# Patient Record
Sex: MALE | Race: ASIAN | NOT HISPANIC OR LATINO | Employment: FULL TIME | URBAN - METROPOLITAN AREA
[De-identification: names, ages, dates, MRNs, and addresses within clinical notes are randomized per-mention and may not be internally consistent; named-entity substitution may affect disease eponyms.]

---

## 2024-05-15 PROBLEM — R29.810 FACIAL DROOP: Status: ACTIVE | Noted: 2024-05-15

## 2024-05-15 PROBLEM — E11.9 TYPE 2 DIABETES MELLITUS, WITHOUT LONG-TERM CURRENT USE OF INSULIN: Status: ACTIVE | Noted: 2024-05-15

## 2024-05-15 PROBLEM — R91.8 LUNG MASS: Status: ACTIVE | Noted: 2024-05-15

## 2024-05-15 PROBLEM — R73.9 HYPERGLYCEMIA: Status: ACTIVE | Noted: 2024-05-15

## 2024-05-15 PROBLEM — E78.5 HYPERLIPIDEMIA: Status: ACTIVE | Noted: 2024-05-15

## 2024-05-17 ENCOUNTER — TELEPHONE (OUTPATIENT)
Dept: DIABETES | Facility: CLINIC | Age: 64
End: 2024-05-17

## 2024-05-21 PROBLEM — F17.200 SMOKING ADDICTION: Status: ACTIVE | Noted: 2024-05-21

## 2024-05-23 ENCOUNTER — TELEPHONE (OUTPATIENT)
Dept: PULMONOLOGY | Facility: CLINIC | Age: 64
End: 2024-05-23
Payer: COMMERCIAL

## 2024-05-23 NOTE — TELEPHONE ENCOUNTER
Dr Paredes's nurse at [facility]  received a message about Mr Samuel and his abnormal CT scan. Dr Paredes said he will see him tomorrow at 8am on [facility]. I called Mr Samuel and he said he has important business tomorrow morning and can he come in the afternoon? I told him I will ask and call him back. Monica Amador LPN

## 2024-05-24 ENCOUNTER — OFFICE VISIT (OUTPATIENT)
Dept: PULMONOLOGY | Facility: CLINIC | Age: 64
End: 2024-05-24
Payer: COMMERCIAL

## 2024-05-24 ENCOUNTER — HOSPITAL ENCOUNTER (OUTPATIENT)
Dept: PULMONOLOGY | Facility: CLINIC | Age: 64
Discharge: HOME | End: 2024-05-24
Payer: COMMERCIAL

## 2024-05-24 ENCOUNTER — LAB VISIT (OUTPATIENT)
Dept: LAB | Facility: HOSPITAL | Age: 64
End: 2024-05-24
Attending: INTERNAL MEDICINE
Payer: COMMERCIAL

## 2024-05-24 VITALS
WEIGHT: 177.25 LBS | HEIGHT: 67 IN | BODY MASS INDEX: 27.82 KG/M2 | OXYGEN SATURATION: 95 % | SYSTOLIC BLOOD PRESSURE: 132 MMHG | DIASTOLIC BLOOD PRESSURE: 72 MMHG | HEART RATE: 117 BPM

## 2024-05-24 DIAGNOSIS — E11.9 NEW ONSET TYPE 2 DIABETES MELLITUS: ICD-10-CM

## 2024-05-24 DIAGNOSIS — J98.4 CAVITARY LESION OF LUNG: Primary | ICD-10-CM

## 2024-05-24 DIAGNOSIS — Z20.1 HISTORY OF EXPOSURE TO TUBERCULOSIS: ICD-10-CM

## 2024-05-24 DIAGNOSIS — J98.4 CAVITARY LESION OF LUNG: ICD-10-CM

## 2024-05-24 DIAGNOSIS — F17.210 CIGARETTE NICOTINE DEPENDENCE WITHOUT COMPLICATION: ICD-10-CM

## 2024-05-24 DIAGNOSIS — R91.8 PULMONARY MASS: ICD-10-CM

## 2024-05-24 LAB
ANION GAP SERPL CALC-SCNC: 11 MMOL/L (ref 8–16)
BUN SERPL-MCNC: 6 MG/DL (ref 8–23)
CALCIUM SERPL-MCNC: 10.2 MG/DL (ref 8.7–10.5)
CHLORIDE SERPL-SCNC: 102 MMOL/L (ref 95–110)
CO2 SERPL-SCNC: 22 MMOL/L (ref 23–29)
CREAT SERPL-MCNC: 0.9 MG/DL (ref 0.5–1.4)
EST. GFR  (NO RACE VARIABLE): >60 ML/MIN/1.73 M^2
ESTIMATED AVG GLUCOSE: 318 MG/DL (ref 68–131)
GLUCOSE SERPL-MCNC: 128 MG/DL (ref 70–110)
HBA1C MFR BLD: 12.7 % (ref 4–5.6)
HIV 1+2 AB+HIV1 P24 AG SERPL QL IA: NORMAL
POTASSIUM SERPL-SCNC: 3.9 MMOL/L (ref 3.5–5.1)
SODIUM SERPL-SCNC: 135 MMOL/L (ref 136–145)

## 2024-05-24 PROCEDURE — 80048 BASIC METABOLIC PNL TOTAL CA: CPT | Performed by: INTERNAL MEDICINE

## 2024-05-24 PROCEDURE — 99205 OFFICE O/P NEW HI 60 MIN: CPT | Mod: S$GLB,,, | Performed by: INTERNAL MEDICINE

## 2024-05-24 PROCEDURE — 94640 AIRWAY INHALATION TREATMENT: CPT | Mod: S$GLB,,, | Performed by: INTERNAL MEDICINE

## 2024-05-24 PROCEDURE — 87389 HIV-1 AG W/HIV-1&-2 AB AG IA: CPT | Performed by: INTERNAL MEDICINE

## 2024-05-24 PROCEDURE — 3075F SYST BP GE 130 - 139MM HG: CPT | Mod: CPTII,S$GLB,, | Performed by: INTERNAL MEDICINE

## 2024-05-24 PROCEDURE — 36415 COLL VENOUS BLD VENIPUNCTURE: CPT | Performed by: INTERNAL MEDICINE

## 2024-05-24 PROCEDURE — 99999 PR PBB SHADOW E&M-EST. PATIENT-LVL III: CPT | Mod: PBBFAC,,, | Performed by: INTERNAL MEDICINE

## 2024-05-24 PROCEDURE — 1159F MED LIST DOCD IN RCRD: CPT | Mod: CPTII,S$GLB,, | Performed by: INTERNAL MEDICINE

## 2024-05-24 PROCEDURE — 3078F DIAST BP <80 MM HG: CPT | Mod: CPTII,S$GLB,, | Performed by: INTERNAL MEDICINE

## 2024-05-24 PROCEDURE — 83036 HEMOGLOBIN GLYCOSYLATED A1C: CPT | Performed by: INTERNAL MEDICINE

## 2024-05-24 PROCEDURE — 3008F BODY MASS INDEX DOCD: CPT | Mod: CPTII,S$GLB,, | Performed by: INTERNAL MEDICINE

## 2024-05-24 NOTE — PROGRESS NOTES
Gave pt 3 % hypertonic saline via nebulizer. Productive cough. NARN. Sent pt to lab to dropp off specimen

## 2024-05-24 NOTE — PROGRESS NOTES
History & Physical  [facility]    SUBJECTIVE:     Chief Complaint:   Cavitary lung lesions    History of Present Illness:  Tu Samuel is a 63 y.o. male who presents for evaluation of cavitary lung lesions.    He was recently in the ED with new-onset Bell's Palsy. His symptoms started on May 13th. They did a CTA head & neck while in the ED per stroke protocol. There was an incidental finding of cavitary lung lesions.    He notes chronic cough sometimes productive of green mucus. He denies any shortness of breath. No hemoptysis. No fevers. No night sweats.    He is from the [facility]. He has lived in [place] [facility] for the past 10 years. Prior to that he was in the [facility].    His mother had tuberculosis which was treated. He lived with his mother for his entire childhood. He has a history of BCG vaccine.    He is a current smoker- a little less than 1PPD. He started smoking just 10 years ago. He did not smoke when he was a young man.    He was recently diagnosed with DM2 & started on glipizide & metformin.    Review of patient's allergies indicates:  No Known Allergies    Past Medical History:   Diagnosis Date    Acne     Diabetes mellitus, type 2     Facial paralysis/[place] palsy     Hyperlipidemia      No past surgical history on file.  Family History   Problem Relation Name Age of Onset    Hypertension Mother      Diabetes Mellitus Mother      Hyperlipidemia Mother      Colon cancer Father       Social History     Socioeconomic History    Marital status: Single   Tobacco Use    Smoking status: Every Day     Current packs/day: 1.00     Types: Cigarettes     Passive exposure: Current    Smokeless tobacco: Never    Tobacco comments:     12 cigs a day   Substance and Sexual Activity    Alcohol use: Never    Drug use: Never    Sexual activity: Not Currently     Review of Systems:  CV: no syncope  ENT: no sore throat  Resp: per hpi  Eyes: no eye pain  Gastrointestinal: no  "vomiting  Integument/Breast: no rash  Musculoskeletal: no arthralgias  Neurological: no headaches  Behavioral/Psych: no confusion  Heme: no bleeding    OBJECTIVE:     Vital Signs  Vitals:    05/24/24 1327   BP: 132/72   Pulse: (!) 117   SpO2: 95%   Weight: 80.4 kg (177 lb 4 oz)   Height: 5' 7" (1.702 m)     Body mass index is 27.76 kg/m².    Physical Exam:  General: no distress  Eyes:  conjunctivae/corneas clear  Nose: no discharge  Neck: trachea midline with no masses appreciated  Lungs:  normal respiratory effort, no wheezes, no rales  Heart: regular rate and rhythm and no murmur  Abdomen: non-distended  Extremities: no cyanosis, no edema, no clubbing  Skin: No rashes or lesions. good skin turgor  Neurologic: alert, oriented, thought content appropriate    Laboratory:  Lab Results   Component Value Date    WBC 9.91 05/15/2024    HGB 14.9 05/15/2024    HCT 44.1 05/15/2024    MCV 91 05/15/2024     05/15/2024     Chest Imaging, My Impression:   CT Chest 5/2024: Bilateral upper lobe cavitary lung lesions    ASSESSMENT/PLAN:     Cavitary Lung Lesions  Possible history of TB exposure (mother)  Cigarette nicotine dependence    - Findings are very suspicious for MTB. NTM infection would be a second possibility.  - Disease is isolated to bilateral upper lobes. This would be an unusual pattern for malignancy.  - Induced sputum for AFB culture & smear & MTB PCR now.  - Quantiferon blood test & HIV blood test now.  - Counseled pt to please wear a mask for the time being when he is around other people.  - Recommend smoking cessation. He just started smoking 10 years ago.    Total professional time spent for the encounter: 60 minutes  Time was spent preparing to see the patient, reviewing results of prior testing, obtaining and/or reviewing separately obtained history, performing a medically appropriate examination and interview, counseling and educating the patient/family, ordering medications/tests/procedures, " referring and communicating with other health care professionals, documenting clinical information in the electronic health record, and independently interpreting results.    Moab Karluk, MD  [facility]

## 2024-05-27 ENCOUNTER — TELEPHONE (OUTPATIENT)
Dept: PULMONOLOGY | Facility: HOSPITAL | Age: 64
End: 2024-05-27
Payer: COMMERCIAL

## 2024-05-27 DIAGNOSIS — A15.0 PULMONARY TUBERCULOSIS: Primary | ICD-10-CM

## 2024-05-27 RX ORDER — ETHAMBUTOL HYDROCHLORIDE 400 MG/1
1200 TABLET, FILM COATED ORAL DAILY
Qty: 90 TABLET | Refills: 0 | Status: SHIPPED | OUTPATIENT
Start: 2024-05-27

## 2024-05-27 RX ORDER — PYRAZINAMIDE TABLET 500 MG/1
1500 TABLET ORAL DAILY
Qty: 90 TABLET | Refills: 0 | Status: SHIPPED | OUTPATIENT
Start: 2024-05-27

## 2024-05-27 RX ORDER — RIFAMPIN 300 MG/1
600 CAPSULE ORAL DAILY
Qty: 60 CAPSULE | Refills: 0 | Status: SHIPPED | OUTPATIENT
Start: 2024-05-27

## 2024-05-27 RX ORDER — ISONIAZID 300 MG/1
300 TABLET ORAL DAILY
Qty: 30 TABLET | Refills: 0 | Status: SHIPPED | OUTPATIENT
Start: 2024-05-27

## 2024-05-27 NOTE — TELEPHONE ENCOUNTER
AFB sputum is smear positive. Quantiferon is positive. Called pt & informed him he has active pulmonary TB. Reinforced previous recommendation that he is contagious & needs to wear a mask. Initiate RIPE therapy. Rx sent to pharmacy. Referral to [facility] clinic placed.    Heriberto Paredes MD  [facility]

## 2024-06-04 PROBLEM — A15.9 TB (TUBERCULOSIS): Status: ACTIVE | Noted: 2024-06-04

## 2024-06-06 ENCOUNTER — TELEPHONE (OUTPATIENT)
Dept: NEUROLOGY | Facility: CLINIC | Age: 64
End: 2024-06-06
Payer: COMMERCIAL

## 2024-06-06 ENCOUNTER — OFFICE VISIT (OUTPATIENT)
Dept: PULMONOLOGY | Facility: CLINIC | Age: 64
End: 2024-06-06
Payer: COMMERCIAL

## 2024-06-06 VITALS
OXYGEN SATURATION: 96 % | HEIGHT: 67 IN | SYSTOLIC BLOOD PRESSURE: 125 MMHG | HEART RATE: 93 BPM | BODY MASS INDEX: 27.96 KG/M2 | WEIGHT: 178.13 LBS | DIASTOLIC BLOOD PRESSURE: 84 MMHG

## 2024-06-06 DIAGNOSIS — A15.0 PULMONARY TUBERCULOSIS: Primary | ICD-10-CM

## 2024-06-06 DIAGNOSIS — G51.0 BELL'S PALSY: ICD-10-CM

## 2024-06-06 DIAGNOSIS — R29.810 FACIAL DROOP: ICD-10-CM

## 2024-06-06 PROCEDURE — 99999 PR PBB SHADOW E&M-EST. PATIENT-LVL IV: CPT | Mod: PBBFAC,,,

## 2024-06-06 PROCEDURE — 99499 UNLISTED E&M SERVICE: CPT | Mod: S$GLB,,,

## 2024-06-06 RX ORDER — GLUCOSAM/CHON-MSM1/C/MANG/BOSW 500-416.6
TABLET ORAL 2 TIMES DAILY
COMMUNITY
Start: 2024-05-16

## 2024-06-06 RX ORDER — BLOOD-GLUCOSE METER
EACH MISCELLANEOUS 2 TIMES DAILY
COMMUNITY
Start: 2024-05-16

## 2024-06-06 NOTE — PROGRESS NOTES
Patient presented to clinic with questions of PET CT scan denial letter from his insurance. Scan was initially ordered on 05/21/2024 to evaluate lung mass. He was seen by Dr. Paredes 3 days later and found to have active TB. Spoke with patient that the PET CT scan is not needed and it was cancelled. Informed patient that he should be contacted soon by the [facility] clinic and was referral was placed by Dr. Paredes. Encouraged to continue wearing PPE while in public. He has started his RIPE therapy. His main complaint today was his bell's palsy. Referral for neurology placed for patient to get recommendations.

## 2024-06-06 NOTE — TELEPHONE ENCOUNTER
Offered patient the soonest neurology appt on 8/21/24, patient declined, due to long wait.  States he will contact [facility] for appointment.

## 2024-07-11 ENCOUNTER — DOCUMENTATION ONLY (OUTPATIENT)
Dept: CRITICAL CARE MEDICINE | Facility: HOSPITAL | Age: 64
End: 2024-07-11
Payer: COMMERCIAL

## 2024-07-11 NOTE — PROGRESS NOTES
[facility] OFFSITE NOTE:      Mr. Samuel is a 62 yo who presents for clinic follow-up, seen by Dr. Collins on 6/10/24 and prior had started treatment for clinical dx TB. He is from the [facility], and his mother had TB when he was a child. He received the BCG vaccine as a child. He is a current smoker for the past 10 years, and a .     He was initially followed at [facility] for Bell's Palsy, DM, HTN, and found to have bilateral upper lobe opacities during an ED visit when CT was done for stroke activation. [facility] sputum was 2+ smear +, culture resulted as positive but species is still not reported, MTB-PCR ordered but not done, IGRA +, thus started on RIPE. AFB cx+ at [place] from 6/26, pending speciation as well. NAAT negative. Protein gap is present on labs.     -CXR today appears worse, with increased opacity in RUL  -He reports he is having some mild urinary urgency and pain, otherwise still has no pulmonary or systemic symptoms  -PE overall benign    Plan: Suspected dx clinical TB with biapical cavitary disease, started on RIPE 5/27/24  -anticipate treatment course of 9 months depending on culture speciation  -continue to follow culture and sensitivities, if NTM will need to alter treatment given extent of lung disease, however reports he is asymptomatic  -continue sputum surveillance  RIF 600mg daily  INH 300mg daily  PZA 1500mg daily x8 weeks  EMB 1200mg daily pending sensitivities or x8 weeks  B6 50mg daily  -labs today  -strongly recommended to see PCP for urinary symptoms  -RTC in 1 month for MD/RN visit      Ghislaine Early MD  Pulmonary and Critical Care Medicine

## 2024-08-22 ENCOUNTER — TELEPHONE (OUTPATIENT)
Dept: DIABETES | Facility: CLINIC | Age: 64
End: 2024-08-22
Payer: COMMERCIAL

## 2024-08-22 NOTE — TELEPHONE ENCOUNTER
"----- Message from Monica Elizabeth sent at 8/22/2024 10:42 AM CDT -----  Scheduling Request      Patient Status: Np    Scheduling Appt: From referral- Dx- New onset type 2 diabetes mellitus     Treating Provider: Dolly Aleman MD    Additional Notes:  "Thank you"  "

## 2024-08-23 ENCOUNTER — TELEPHONE (OUTPATIENT)
Dept: PULMONOLOGY | Facility: CLINIC | Age: 64
End: 2024-08-23
Payer: COMMERCIAL

## 2024-08-28 ENCOUNTER — PATIENT MESSAGE (OUTPATIENT)
Dept: DIABETES | Facility: CLINIC | Age: 64
End: 2024-08-28
Payer: COMMERCIAL

## 2024-09-25 ENCOUNTER — PATIENT MESSAGE (OUTPATIENT)
Dept: DIABETES | Facility: CLINIC | Age: 64
End: 2024-09-25
Payer: COMMERCIAL

## 2024-10-15 ENCOUNTER — PATIENT MESSAGE (OUTPATIENT)
Dept: DIABETES | Facility: CLINIC | Age: 64
End: 2024-10-15
Payer: COMMERCIAL

## 2024-10-22 ENCOUNTER — PATIENT MESSAGE (OUTPATIENT)
Dept: RESEARCH | Facility: HOSPITAL | Age: 64
End: 2024-10-22
Payer: COMMERCIAL

## 2024-10-28 ENCOUNTER — PATIENT MESSAGE (OUTPATIENT)
Dept: PRIMARY CARE CLINIC | Facility: CLINIC | Age: 64
End: 2024-10-28
Payer: COMMERCIAL

## 2024-11-05 ENCOUNTER — PATIENT MESSAGE (OUTPATIENT)
Dept: RESEARCH | Facility: HOSPITAL | Age: 64
End: 2024-11-05
Payer: COMMERCIAL

## 2024-11-19 ENCOUNTER — DOCUMENTATION ONLY (OUTPATIENT)
Dept: PULMONOLOGY | Facility: CLINIC | Age: 64
End: 2024-11-19
Payer: COMMERCIAL

## 2024-11-19 NOTE — PROGRESS NOTES
63 year old man who presents to clinic for follow-up management for biapical cavitary disease due to TB on treatment.    Patient was seen by Dr. Collins on 6/10/24 and prior had started treatment for clinical dx TB. He is from the [facility], and his mother had TB when he was a child. He received the BCG vaccine as a child. He is a current smoker for the past 10 years, and a .      Patient was initially followed at [facility] for Bell's Palsy, DM, HTN, and found to have bilateral upper lobe opacities during an ED visit when CT was done for stroke activation. [facility] sputum was 2+ smear +, culture resulted as positive. IGRA +. Patient was started on RIPE on 06/10/2024. Patient completed 4 months of RIPE, now on RI.    Today patient discloses no cough, shortness of breath, fever, chills, night sweat or weight loss.   Examination unremarkable except for truncal obesity.    CXR today showed improving infiltrates     We plan to continue RIF 600mg daily and INH 300mg daily with B6  Anticipate treatment course of 9 months.  Continue sputum surveillance    Patient was seen with the attending physician MD Kika Lamar MD  [facility]